# Patient Record
(demographics unavailable — no encounter records)

---

## 2024-10-08 NOTE — DISCUSSION/SUMMARY
[FreeTextEntry1] : From a cardiovascular standpoint, Karen is doing well.  She has no significant exertional symptoms.  Her blood pressure is at goal, and physical exam is unremarkable.  Her EKG demonstrates a sinus rhythm, without obvious ischemia or chamber enlargement.  She does have a family history of aortic aneurysm, though a normal sonogram of her aorta recently.  She also reports an echocardiogram, and I have requested this result.  I am setting her up for a calcium score for further risk stratification, and then we will decide if pharmacologic therapy and/or stress testing if necessary.  She will stay active and eat right.  We will speak after the above testing, and arrange follow-up. [EKG obtained to assist in diagnosis and management of assessed problem(s)] : EKG obtained to assist in diagnosis and management of assessed problem(s)

## 2024-10-08 NOTE — HISTORY OF PRESENT ILLNESS
[FreeTextEntry1] : Karen is a pleasant 70-year-old female here for initial evaluation.  She is generally active, always walking, playing some pickle ball, and line dancing.  She does report being more tired than usual.  She has some palpitations, which seem to occur in the setting of stress.  She is a non-smoker.  Her most recent LDL was 95.  She has a family history of an aneurysm in her mother.  Her father required 2 bypass surgeries, with initial 1 occurring at the age of 59, and had vascular dementia.  Her brother had CAD and stents.  Her sister has orthostatic hypotension.  She reports a recent echocardiogram, with the result unavailable.  She did have an abdominal ultrasound which demonstrated no aneurysm, but did comment on diffuse moderate calcification changes of the aorta, as well as a relative velocity evaluation bilaterally in the iliac arteries, without flow disturbance.  She was sent to a vascular surgeon, who recommended no additional evaluation.

## 2025-03-14 NOTE — ASSESSMENT
[FreeTextEntry1] : The patient is a 70 yo woman presenting with left knee swelling and stiffness but not pain. She noticed it yesterday afternoon. She had no injury causing this. She denies paresthesias. The patient reports stiffness with using stairs or walking distances. She reports soreness with bending the knee. She denies buckling or instability.   Left knee exam: Well appearing female in no apparent distress. No rashes, scars, or abrasions. Neurovascularly intact. Tender to palpation at medial joint line. No effusion noted. Ranging from 0-130. No deformity. Anterior/posterior drawer negative, - Mcmurrays. - Lachmans. Screening exam of the left hip shows a full, painless ROM.  Left knee xrays taken today in office, 4 views WB - Mild OA noted to the medial compartment. No fractures. No loose bodies. No obvious tumors, masses, or lesions.  The patient will ice, rest, and elevate the knee. She can use oral AIs as needed. She will consider PT and was given a prescription. She will call as needed.

## 2025-03-14 NOTE — HISTORY OF PRESENT ILLNESS
[3] : 3 [FreeTextEntry5] : 70 y/o F presents for NP eval today. Pt notes of swelling and stiffness since yesterday. Denies any associated trauma. No prior tx.